# Patient Record
Sex: FEMALE | Race: WHITE | Employment: STUDENT | URBAN - METROPOLITAN AREA
[De-identification: names, ages, dates, MRNs, and addresses within clinical notes are randomized per-mention and may not be internally consistent; named-entity substitution may affect disease eponyms.]

---

## 2017-09-20 ENCOUNTER — HOSPITAL ENCOUNTER (OUTPATIENT)
Dept: PHYSICAL THERAPY | Age: 22
Setting detail: THERAPIES SERIES
Discharge: HOME | End: 2017-09-20
Payer: COMMERCIAL

## 2017-09-20 PROCEDURE — G8985 CARRY GOAL STATUS: HCPCS

## 2017-09-20 PROCEDURE — G8984 CARRY CURRENT STATUS: HCPCS

## 2017-09-20 PROCEDURE — 97140 MANUAL THERAPY 1/> REGIONS: CPT

## 2017-09-20 PROCEDURE — 97162 PT EVAL MOD COMPLEX 30 MIN: CPT

## 2017-09-20 PROCEDURE — 97110 THERAPEUTIC EXERCISES: CPT

## 2017-09-22 ENCOUNTER — HOSPITAL ENCOUNTER (OUTPATIENT)
Dept: PHYSICAL THERAPY | Age: 22
Setting detail: THERAPIES SERIES
Discharge: HOME | End: 2017-09-22
Payer: COMMERCIAL

## 2017-09-22 PROCEDURE — 97110 THERAPEUTIC EXERCISES: CPT

## 2017-09-22 PROCEDURE — 97140 MANUAL THERAPY 1/> REGIONS: CPT

## 2017-09-25 ENCOUNTER — HOSPITAL ENCOUNTER (OUTPATIENT)
Dept: PHYSICAL THERAPY | Age: 22
Setting detail: THERAPIES SERIES
Discharge: HOME | End: 2017-09-25
Payer: COMMERCIAL

## 2017-09-25 PROCEDURE — 97140 MANUAL THERAPY 1/> REGIONS: CPT

## 2017-09-25 PROCEDURE — 97110 THERAPEUTIC EXERCISES: CPT

## 2017-09-25 PROCEDURE — 97530 THERAPEUTIC ACTIVITIES: CPT

## 2017-09-27 ENCOUNTER — HOSPITAL ENCOUNTER (OUTPATIENT)
Dept: PHYSICAL THERAPY | Age: 22
Setting detail: THERAPIES SERIES
Discharge: HOME | End: 2017-09-27
Payer: COMMERCIAL

## 2017-09-27 PROCEDURE — 97140 MANUAL THERAPY 1/> REGIONS: CPT

## 2017-09-27 PROCEDURE — 97110 THERAPEUTIC EXERCISES: CPT

## 2017-10-02 ENCOUNTER — HOSPITAL ENCOUNTER (OUTPATIENT)
Dept: PHYSICAL THERAPY | Age: 22
Setting detail: THERAPIES SERIES
Discharge: HOME | End: 2017-10-02
Payer: COMMERCIAL

## 2017-10-02 PROCEDURE — 97140 MANUAL THERAPY 1/> REGIONS: CPT

## 2017-10-02 PROCEDURE — 97110 THERAPEUTIC EXERCISES: CPT

## 2017-10-02 NOTE — PROGRESS NOTES
performed Scar Massage                          Assessment:   Conditions Requiring Skilled Therapeutic Intervention  Body structures, Functions, Activity limitations: Decreased functional mobility ; Decreased ROM; Decreased strength  Assessment: Pt. progressing with ROM and  strength, also tolerates increased reps with wrist strengthening. Issued CP post session states \"achy. \"   Treatment Diagnosis: s/p L wrist hardware removal on 8-1-17 (s/p ORIF due to radius fx Nov 2015)  Prognosis: Good  REQUIRES PT FOLLOW UP: Yes  Activity Tolerance  Activity Tolerance: Patient Tolerated treatment well      G-Code:     OutComes Score                                                     Goals:  Short term goals  Time Frame for Short term goals: 1-2 weeks  Short term goal 1: Pt reports 4/10 or less pain of L forearm/wrist with pressure/touch  Long term goals  Time Frame for Long term goals : 5-8 weeks (per order)  Long term goal 1: Pt reports 2/10 or less L wrist/forearm pain with pressure/touch  Long term goal 2: Increase L  strength to 50# or greater so pt can use L hand to open a jar  Long term goal 3: Increase L wrist strength to 4+/5 so pt can use her hand painfree  Long term goal 4: Improve Quick Dash to <20% or better  Long term goal 5:  Indep with HEP  Patient Goals   Patient goals : Be able to be pain free    Plan:          Times per week: (per md order) 1-3x week for 5-8 weeks=12 visits    Therapy Time   Individual Concurrent Group Co-treatment   Time In  1400         Time Out  1500         Minutes  [address] [place] [address]  License and [address] Number: [id number]

## 2017-10-04 ENCOUNTER — HOSPITAL ENCOUNTER (OUTPATIENT)
Dept: PHYSICAL THERAPY | Age: 22
Setting detail: THERAPIES SERIES
Discharge: HOME | End: 2017-10-04
Payer: COMMERCIAL

## 2017-10-04 PROCEDURE — 97140 MANUAL THERAPY 1/> REGIONS: CPT

## 2017-10-04 PROCEDURE — 97110 THERAPEUTIC EXERCISES: CPT

## 2017-10-04 NOTE — PROGRESS NOTES
Physical Therapy  Daily Treatment Note  Date: 10/4/2017  Patient Name: Manny Baker  MRN: [id number]     :   1995    Treatment Diagnosis: s/p L wrist hardware removal on 17 (s/p ORIF due to radius fx 2015    Subjective:   General  Chart Reviewed: Yes  Referring Practitioner: Dr. Sarah Monahan  PT Visit Information  Onset Date: 17  Total # of Visits Approved: 12 ((per order) 1-3 x week for 5-8 weeks=12 visits)  Total # of Visits to Date: 6  Plan of Care/Certification Expiration Date: 10/20/17  No Show: 0  Progress Note Due Date: 10/20/17  Subjective  Subjective: Pt. reports able to wear metal watch over scar all day and did not bother her. Pt. also reports has attempted performing strengthening therex with water bottle at home. Pain Screening  Patient Currently in Pain: No  Vital Signs  Patient Currently in Pain: No       Treatment Activities:   Manual therapy  Joint mobilization: PROM to L wrist with Grade I -III Mobs to improve ROM and mild distraction                                   Exercises  Exercise 1: Wrist flex stretch x 3 H30 passive with distraction   Exercise 2: Prayer stretch x 2 H 60  Exercise 3: Red  putty  x 20 reps (L hand) hold 3 sec   Exercise 4: Red Putty mimic open/ close jar x10 ea. Exercise 5: L wrist extension 2x10  hold 3 sec   1#  Exercise 6: L wrist flexion 2x10 1# H3   Exercise 7: sup/pronation 2x 10 reps 1#  Exercise 8: L wrist UD 2x10 1# H3   Exercise 9: L wrist RD 2x10  hold 3 sec 1#   Exercise 11: wrist maze x3   Exercise 12: wrist roller no # x3   Exercise 13: resisted finger ext with YTB x10      Modalities  Cryotherapy (Minutes\Location): Issued ice to go   Manual therapy  Joint mobilization: PROM to L wrist with Grade I -III Mobs to improve ROM and mild distraction                           Assessment:   Conditions Requiring Skilled Therapeutic Intervention  Body structures, Functions, Activity limitations: Decreased functional mobility ; Decreased ROM;Decreased strength  Assessment: Pt. is tolerating increased therex and also increased repititions of with less rest or c/o fatigue. Pt. tolerates session w/o pain. Cont to tolerance. Treatment Diagnosis: s/p L wrist hardware removal on 8-1-17 (s/p ORIF due to radius fx Nov 2015)  Prognosis: Good  REQUIRES PT FOLLOW UP: Yes  Activity Tolerance  Activity Tolerance: Patient Tolerated treatment well      G-Code:     OutComes Score                                                     Goals:  Short term goals  Time Frame for Short term goals: 1-2 weeks  Short term goal 1: Pt reports 4/10 or less pain of L forearm/wrist with pressure/touch (GOAL  MET )  Long term goals  Time Frame for Long term goals : 5-8 weeks (per order)  Long term goal 1: Pt reports 2/10 or less L wrist/forearm pain with pressure/touch  Long term goal 2: Increase L  strength to 50# or greater so pt can use L hand to open a jar  Long term goal 3: Increase L wrist strength to 4+/5 so pt can use her hand painfree  Long term goal 4: Improve Quick Dash to <20% or better  Long term goal 5:  Indep with HEP  Patient Goals   Patient goals : Be able to be pain free    Plan:          Times per week: (per md order) 1-3x week for 5-8 weeks=12 visits    Therapy Time   Individual Concurrent Group Co-treatment   Time In  1400         Time Out  1455         Minutes  [address] [facility],[address], [facility]  License and [address] Number: [id number]

## 2017-10-13 ENCOUNTER — HOSPITAL ENCOUNTER (OUTPATIENT)
Dept: PHYSICAL THERAPY | Age: 22
Setting detail: THERAPIES SERIES
Discharge: HOME | End: 2017-10-13
Payer: COMMERCIAL

## 2017-10-13 PROCEDURE — 97140 MANUAL THERAPY 1/> REGIONS: CPT

## 2017-10-13 PROCEDURE — 97110 THERAPEUTIC EXERCISES: CPT

## 2017-10-13 NOTE — PROGRESS NOTES
Physical Therapy  Daily Treatment Note  Date: 10/13/2017  Patient Name: Samantha Vickers  MRN: [id number]     :   1995    Treatment Diagnosis: s/p L wrist hardware removal on 17 (s/p ORIF due to radius fx 2015    Subjective:   General  Chart Reviewed: Yes  Referring Practitioner: Dr. Juan Carlos Mendez  PT Visit Information  Onset Date: 17  Total # of Visits Approved: 12 ((per order) 1-3 x week for 5-8 weeks=12 visits)  Total # of Visits to Date: 7  Plan of Care/Certification Expiration Date: 10/20/17  No Show: 0  Progress Note Due Date: 10/20/17  Subjective  Subjective: Pt. notices forearm more fatigued with carrying things. General Comment  Comments: Pt. going on vacation next week  and will not be able to attend therapy. Pain Screening  Patient Currently in Pain: No  Vital Signs  Patient Currently in Pain: No       Treatment Activities:   Manual therapy  Joint mobilization: PROM to L wrist with Grade I -III Mobs to improve ROM and mild distraction                                   Exercises  Exercise 1: Wrist flex stretch x 3 H30 passive with distraction   Exercise 3: Red  putty  x 20 reps (L hand) hold 3 sec   Exercise 4: Red Putty mimic open/ close jar 2x10 ea. Exercise 5: L wrist extension 2x10  hold 5 sec   1#  Exercise 6: L wrist flexion 2x10 1# H5  Exercise 7: sup/pronation x20 reps 1#  Exercise 8: L wrist UD 2x10 1# H5  Exercise 9: L wrist RD 2x10  hold 5 sec 1#   Exercise 12: wrist roller 1/2# x 1 and no # x2   Exercise 14: bicep 3 way no wt. 2 x10 slow and controlled VC for proper wrist aignment      Modalities  Cryotherapy (Minutes\Location): Issued ice to go   Other: KT tape to Scar at 25 % tension with tubigrip issued to keep KT tape in place.   Manual therapy  Joint mobilization: PROM to L wrist with Grade I -III Mobs to improve ROM and mild distraction                           Assessment:   Conditions Requiring Skilled Therapeutic Intervention  Body structures, Functions, Activity limitations: Decreased functional mobility ; Decreased ROM; Decreased strength  Assessment: Pt. tolerates tx. w/o pain only c/o fatigue. Cont to tolerance. Treatment Diagnosis: s/p L wrist hardware removal on 8-1-17 (s/p ORIF due to radius fx Nov 2015)  Prognosis: Good  REQUIRES PT FOLLOW UP: Yes  Activity Tolerance  Activity Tolerance: Patient Tolerated treatment well      G-Code:     OutComes Score                                                     Goals:  Short term goals  Time Frame for Short term goals: 1-2 weeks  Short term goal 1: Pt reports 4/10 or less pain of L forearm/wrist with pressure/touch (GOAL  MET )  Long term goals  Time Frame for Long term goals : 5-8 weeks (per order)  Long term goal 1: Pt reports 2/10 or less L wrist/forearm pain with pressure/touch  Long term goal 2: Increase L  strength to 50# or greater so pt can use L hand to open a jar  Long term goal 3: Increase L wrist strength to 4+/5 so pt can use her hand painfree  Long term goal 4: Improve Quick Dash to <20% or better  Long term goal 5:  Indep with HEP  Patient Goals   Patient goals : Be able to be pain free    Plan:           Times per week: (per md order) 1-3x week for 5-8 weeks=12 visits    Therapy Time   Individual Concurrent Group Co-treatment   Time In  1400         Time Out  1500         Minutes  [facility]  License and [address] Number: [id number]

## 2017-11-01 ENCOUNTER — HOSPITAL ENCOUNTER (OUTPATIENT)
Dept: PHYSICAL THERAPY | Age: 22
Setting detail: THERAPIES SERIES
Discharge: HOME | End: 2017-11-01
Payer: COMMERCIAL

## 2017-11-01 ENCOUNTER — HOSPITAL ENCOUNTER (OUTPATIENT)
Dept: LAB | Age: 22
Discharge: HOME | End: 2017-11-01
Payer: COMMERCIAL

## 2017-11-01 LAB
BASOPHILS ABSOLUTE: 0.1 K/UL (ref 0–0.2)
BASOPHILS RELATIVE PERCENT: 0.9 %
EOSINOPHILS ABSOLUTE: 0.1 K/UL (ref 0–0.7)
EOSINOPHILS RELATIVE PERCENT: 1.7 %
HCT VFR BLD CALC: 41.7 % (ref 37–47)
HEMOGLOBIN: 13.6 G/DL (ref 12–16)
LYMPHOCYTES ABSOLUTE: 2.2 K/UL (ref 1–4.8)
LYMPHOCYTES RELATIVE PERCENT: 28.9 %
MCH RBC QN AUTO: 28.3 PG (ref 27–31.3)
MCHC RBC AUTO-ENTMCNC: 32.5 % (ref 33–37)
MCV RBC AUTO: 87 FL (ref 82–100)
MONOCYTES ABSOLUTE: 0.5 K/UL (ref 0.2–0.8)
MONOCYTES RELATIVE PERCENT: 6.3 %
NEUTROPHILS ABSOLUTE: 4.7 K/UL (ref 1.4–6.5)
NEUTROPHILS RELATIVE PERCENT: 62.2 %
PDW BLD-RTO: 13.1 % (ref 11.5–14.5)
PLATELET # BLD: 256 K/UL (ref 130–400)
RBC # BLD: 4.8 M/UL (ref 4.2–5.4)
WBC # BLD: 7.6 K/UL (ref 4.5–11)

## 2017-11-01 PROCEDURE — 97530 THERAPEUTIC ACTIVITIES: CPT

## 2017-11-01 PROCEDURE — 97140 MANUAL THERAPY 1/> REGIONS: CPT

## 2017-11-01 PROCEDURE — 86665 EPSTEIN-BARR CAPSID VCA: CPT

## 2017-11-01 PROCEDURE — 86663 EPSTEIN-BARR ANTIBODY: CPT

## 2017-11-01 PROCEDURE — 85025 COMPLETE CBC W/AUTO DIFF WBC: CPT

## 2017-11-01 PROCEDURE — 36415 COLL VENOUS BLD VENIPUNCTURE: CPT

## 2017-11-01 PROCEDURE — 86664 EPSTEIN-BARR NUCLEAR ANTIGEN: CPT

## 2017-11-01 PROCEDURE — 97110 THERAPEUTIC EXERCISES: CPT

## 2017-11-01 NOTE — PROGRESS NOTES
2015)  Prognosis: Good  REQUIRES PT FOLLOW UP: Yes         Plan    Continue 1-2x/week for 4-6 weeks    G-Code 37%=CJ         Goals  Short term goals  Time Frame for Short term goals: 1-2 weeks  Short term goal 1: Pt reports 4/10 or less pain of L forearm/wrist with pressure/touch (GOAL MET - has occasional pain in radial side)  Long term goals  Time Frame for Long term goals : 5-8 weeks (per order)  Long term goal 1: Pt reports 2/10 or less L wrist/forearm pain with pressure/touch (Still having radial side wrist pain but minimal- Partial MET)  Long term goal 2: Increase L  strength to 50# or greater so pt can use L hand to open a jar (48# - Almost met but still doesnt use hand to open a jar)  Long term goal 3: Increase L wrist strength to 4+/5 so pt can use her hand painfree (GOAL Partially MET -not painfree yet )  Long term goal 4: Improve Quick Dash to <20% or better (Quick Dash at recheck: 37% -NOT MET)  Long term goal 5:  Indep with HEP (Able to do indep - GOAL MET)  Patient Goals   Patient goals : Be able to be pain free       Therapy Time   Individual Concurrent Group Co-treatment   Time In  1:00pm         Time Out  2:00pm         Minutes  60 min                 Marko Tai, [address] [place] [address] [phone]

## 2017-11-03 LAB
EPSTEIN BARR VIRUS NUCLEAR AB IGG: <3 U/ML (ref 0–21.9)
EPSTEIN-BARR EARLY ANTIGEN ANTIBODY: <5 U/ML (ref 0–10.9)
EPSTEIN-BARR VCA IGG: <10 U/ML (ref 0–21.9)
EPSTEIN-BARR VCA IGM: <10 U/ML (ref 0–43.9)

## 2017-11-07 ENCOUNTER — HOSPITAL ENCOUNTER (EMERGENCY)
Age: 22
Discharge: HOME | End: 2017-11-07
Attending: EMERGENCY MEDICINE
Payer: COMMERCIAL

## 2017-11-07 VITALS
TEMPERATURE: 97.7 F | WEIGHT: 125 LBS | SYSTOLIC BLOOD PRESSURE: 128 MMHG | DIASTOLIC BLOOD PRESSURE: 70 MMHG | HEART RATE: 64 BPM | OXYGEN SATURATION: 100 % | BODY MASS INDEX: 21.34 KG/M2 | HEIGHT: 64 IN | RESPIRATION RATE: 18 BRPM

## 2017-11-07 DIAGNOSIS — G93.31 POSTVIRAL FATIGUE SYNDROME: Primary | ICD-10-CM

## 2017-11-07 PROCEDURE — 99282 EMERGENCY DEPT VISIT SF MDM: CPT

## 2017-11-07 ASSESSMENT — ENCOUNTER SYMPTOMS
VOMITING: 0
NAUSEA: 0
EYE DISCHARGE: 0
SORE THROAT: 0
EYE PAIN: 0
ABDOMINAL DISTENTION: 0
BLOOD IN STOOL: 0
WHEEZING: 0
BACK PAIN: 0
GASTROINTESTINAL NEGATIVE: 1
SINUS PAIN: 0
EYES NEGATIVE: 1
ABDOMINAL PAIN: 0
DIARRHEA: 0
SHORTNESS OF BREATH: 0
RESPIRATORY NEGATIVE: 1
COUGH: 0
CHEST TIGHTNESS: 0

## 2017-11-07 NOTE — ED PROVIDER NOTES
[address] [place] [address] ED  eMERGENCY dEPARTMENT eNCOUnter      Pt Name: Brooklyn Garcia  MRN: [id number]  [place] 1995  Date of evaluation: 11/7/2017  Provider: Alyssa Morales, [facility]       Chief Complaint   Patient presents with    Fatigue         HISTORY OF PRESENT ILLNESS   (Location/Symptom, Timing/Onset, Context/Setting, Quality, Duration, Modifying Factors, Severity)  Note limiting factors. Brooklyn Garcia is a 23 y.o. female who presents to the emergency department Complaining of chronic fatigue. Patient's had this for months she's been worked up significantly looking for Lyme's disease looking for mononucleosis various other causes she presents now with the same thing there's nothing that's new she is stating that she has to get 13 hours a sleep and it still doesn't help. Patient has a history of depression and I strongly suspect that's what this is. HPI    Nursing Notes were reviewed. REVIEW OF SYSTEMS    (2-9 systems for level 4, 10 or more for level 5)     Review of Systems   Constitutional: Positive for fatigue. Negative for chills and fever. HENT: Negative. Negative for ear pain, sinus pain and sore throat. Eyes: Negative. Negative for pain and discharge. Respiratory: Negative. Negative for cough, chest tightness, shortness of breath and wheezing. Cardiovascular: Negative. Negative for chest pain, palpitations and leg swelling. Gastrointestinal: Negative. Negative for abdominal distention, abdominal pain, blood in stool, diarrhea, nausea and vomiting. Endocrine: Negative. Negative for polydipsia and polyuria. Genitourinary: Negative. Negative for difficulty urinating, dysuria, flank pain, frequency, hematuria and urgency. Musculoskeletal: Negative. Negative for arthralgias, back pain, myalgias and neck pain. Skin: Negative. Negative for rash and wound. Neurological: Negative. Negative for dizziness, seizures, syncope, weakness and headaches. Hematological: Negative. Negative for adenopathy. Does not bruise/bleed easily. Psychiatric/Behavioral: Positive for sleep disturbance. Negative for confusion, hallucinations, self-injury and suicidal ideas. All other systems reviewed and are negative. Except as noted above the remainder of the review of systems was reviewed and negative. PAST MEDICAL HISTORY     Past Medical History:   Diagnosis Date    Arthritis     RA    Depression          SURGICAL HISTORY     History reviewed. No pertinent surgical history. CURRENT MEDICATIONS       Previous Medications    No medications on file       ALLERGIES     Review of patient's allergies indicates no known allergies. FAMILY HISTORY     History reviewed. No pertinent family history. SOCIAL HISTORY       Social History     Social History    Marital status: Single     Spouse name: N/A    Number of children: N/A    Years of education: N/A     Social History Main Topics    Smoking status: Never Smoker    Smokeless tobacco: Never Used    Alcohol use No    Drug use:      Frequency: 1.0 time per week     Types: Marijuana    Sexual activity: Not Asked     Other Topics Concern    None     Social History Narrative    None       SCREENINGS             PHYSICAL EXAM    (up to 7 for level 4, 8 or more for level 5)     ED Triage Vitals [11/07/17 1553]   BP Temp Temp Source Pulse Resp SpO2 Height Weight   125/68 97.7 °F (36.5 °C) Oral 62 18 100 % 5' 4\" (1.626 m) 125 lb (56.7 kg)       Physical Exam   Constitutional: She is oriented to person, place, and time. She appears well-developed and well-nourished. HENT:   Head: Normocephalic and atraumatic. Eyes: Conjunctivae and EOM are normal. Pupils are equal, round, and reactive to light. Neck: Normal range of motion. Neck supple. No JVD present. No tracheal deviation present. Cardiovascular: Normal rate, regular rhythm, normal heart sounds and intact distal pulses.   Exam reveals no

## 2017-11-08 ENCOUNTER — HOSPITAL ENCOUNTER (OUTPATIENT)
Dept: PHYSICAL THERAPY | Age: 22
Setting detail: THERAPIES SERIES
Discharge: HOME | End: 2017-11-08
Payer: COMMERCIAL

## 2017-11-08 NOTE — PROGRESS NOTES
Physical Therapy  Daily Treatment Note  Date: 2017  Patient Name: Renaee Osgood  MRN: [id number]     :   1995    Subjective:   General  Chart Reviewed: Yes  Referring Practitioner: Dr. Iker Lemon  PT Visit Information  Onset Date: 17  Total # of Visits Approved: 20 (8+ 1-2x per week for 4-6 weeks= 20 visits)  Total # of Visits to Date: 8  Plan of Care/Certification Expiration Date: 17  No Show: 2  Canceled Appointment: 0  Subjective  Subjective: Pt failed to show for therapy this date. This is her 2nd no show. Treatment Activities:                                                                          Assessment:   Conditions Requiring Skilled Therapeutic Intervention  Body structures, Functions, Activity limitations: Decreased functional mobility ; Decreased ROM; Decreased strength  Treatment Diagnosis: s/p L wrist hardware removal on 17 (s/p ORIF due to radius fx 2015)  REQUIRES PT FOLLOW UP: Yes      G-Code:     OutComes Score                                                     Goals:  Short term goals  Time Frame for Short term goals: 1-2 weeks  Short term goal 1: Pt reports 4/10 or less pain of L forearm/wrist with pressure/touch  Long term goals  Time Frame for Long term goals : 5-8 weeks (per order)  Long term goal 1: Pt reports 2/10 or less L wrist/forearm pain with pressure/touch  Long term goal 2: Increase L  strength to 50# or greater so pt can use L hand to open a jar  Long term goal 3: Increase L wrist strength to 4+/5 so pt can use her hand painfree  Long term goal 4: Improve Quick Dash to <20% or better  Long term goal 5:  Indep with HEP  Patient Goals   Patient goals : Be able to be pain free    Plan:       Frequency and duration of tx  Days:  (1-2)  Weeks:  (4-6)     Therapy Time   Individual Concurrent Group Co-treatment   Time In  1400         Time Out  1410         Minutes  5   No show                 Chastity Lara PTA  License and Documentation

## 2019-03-08 ENCOUNTER — OFFICE VISIT (OUTPATIENT)
Dept: GASTROENTEROLOGY | Age: 24
End: 2019-03-08
Payer: COMMERCIAL

## 2019-03-08 VITALS
OXYGEN SATURATION: 97 % | HEART RATE: 60 BPM | BODY MASS INDEX: 23.66 KG/M2 | TEMPERATURE: 97.9 F | WEIGHT: 142 LBS | HEIGHT: 65 IN | SYSTOLIC BLOOD PRESSURE: 110 MMHG | DIASTOLIC BLOOD PRESSURE: 60 MMHG

## 2019-03-08 DIAGNOSIS — L29.0 RECTAL ITCHING: Primary | ICD-10-CM

## 2019-03-08 PROCEDURE — 99203 OFFICE O/P NEW LOW 30 MIN: CPT | Performed by: INTERNAL MEDICINE

## 2019-03-08 RX ORDER — ZINC OXIDE AND COCOA BUTTER 270; 2052 MG/1; MG/1
SUPPOSITORY RECTAL
Qty: 24 SUPPOSITORY | Refills: 3 | Status: SHIPPED | OUTPATIENT
Start: 2019-03-08

## 2019-03-08 RX ORDER — LEVONORGESTREL AND ETHINYL ESTRADIOL 0.1-0.02MG
1 KIT ORAL DAILY
COMMUNITY
Start: 2019-02-04

## 2019-03-08 RX ORDER — DOCUSATE SODIUM 100 MG/1
100 CAPSULE, LIQUID FILLED ORAL NIGHTLY
Qty: 30 CAPSULE | Refills: 3 | Status: SHIPPED | OUTPATIENT
Start: 2019-03-08

## 2019-03-08 RX ORDER — NADOLOL 20 MG/1
20 TABLET ORAL DAILY
COMMUNITY

## 2019-03-08 RX ORDER — ESCITALOPRAM OXALATE 10 MG/1
20 TABLET ORAL DAILY
COMMUNITY
Start: 2018-06-13

## 2019-05-10 ENCOUNTER — OFFICE VISIT (OUTPATIENT)
Dept: GASTROENTEROLOGY | Age: 24
End: 2019-05-10
Payer: COMMERCIAL

## 2019-05-10 VITALS
BODY MASS INDEX: 24.41 KG/M2 | SYSTOLIC BLOOD PRESSURE: 108 MMHG | WEIGHT: 143 LBS | TEMPERATURE: 97.6 F | HEART RATE: 82 BPM | HEIGHT: 64 IN | DIASTOLIC BLOOD PRESSURE: 66 MMHG | OXYGEN SATURATION: 99 %

## 2019-05-10 DIAGNOSIS — L29.0 RECTAL ITCHING: Primary | ICD-10-CM

## 2019-05-10 PROCEDURE — 99213 OFFICE O/P EST LOW 20 MIN: CPT | Performed by: INTERNAL MEDICINE

## 2019-05-10 NOTE — PROGRESS NOTES
Gastroenterology Clinic Follow up Visit    Nick Rosen  <[id number]>  Chief Complaint   Patient presents with    Follow-up     Background:24 y.o. female last seen in GI clinic on 3/8/2019 with itching in the rectal area and inside the anal canal.  Patient was prescribed Calmol, stool softeners. Patient continues to have symptoms, she has normal bowel movements. Her symptoms started in October 2018, she restarted taking the Ritalin in July 2018  She has decreased her marijuana use to 2 times a week  In the interim she has completed her theses and presented it and finishing next week    Review of Systems   All other systems reviewed and are negative. Past medical history, past surgical history, medication list, social and familyhistory reviewed    Blood pressure 108/66, pulse 82, temperature 97.6 °F (36.4 °C), height 5' 4\" (1.626 m), weight 143 lb (64.9 kg), SpO2 99 %. Physical Exam   Constitutional: She is oriented to person, place, and time. She appears well-developed and well-nourished. No distress. Eyes: No scleral icterus. Cardiovascular: Normal rate and regular rhythm. Pulmonary/Chest: Effort normal and breath sounds normal.   Abdominal: Soft. Normal appearance and bowel sounds are normal. She exhibits no distension, no ascites and no mass. There is no hepatosplenomegaly. There is no tenderness. There is no rebound, no guarding and no CVA tenderness. Genitourinary: Rectum normal. Rectal exam shows no tenderness (pinworm slide test perfromed, no digital eaxam, perfromed in presence od Chaperone Harle Chihuahua). Musculoskeletal: Normal range of motion. Lymphadenopathy:     She has no cervical adenopathy. Neurological: She is alert and oriented to person, place, and time. Psychiatric: She has a normal mood and affect.  Her behavior is normal. Judgment and thought content normal.     Laboratory, Pathology, Radiology reviewed in detail with relevantimportant investigations summarized below:    No results for input(s): WBC, HGB, HCT, MCV, PLT in the last 720 hours. No results found for: ALT, AST, GGT, ALKPHOS, BILITOT  No results found. Assessment and Plan:  Haim Chinedu 27 y.o. female for follow up. Patient with ongoing rectal itching despite stool softeners and Calmol4 suppositories. - Will check for pinworm infestation  - Specimen collected in clinic and sent for analysis to microbiology    Return if symptoms worsen or fail to improve. Talya Gates MD   StaffGastroenterologist  [facility]    Please note this report has been partially produced using speech recognitionsoftware  and may cause contain errors related to that system including grammar, punctuation and spelling as well as words andphrases that may seem inappropriate. If there are questions or concerns please feel free to contact me to clarify.

## 2019-05-11 LAB — PINWORM PREP - ENTEROBIUS: NORMAL
